# Patient Record
Sex: FEMALE | Race: WHITE | NOT HISPANIC OR LATINO | URBAN - METROPOLITAN AREA
[De-identification: names, ages, dates, MRNs, and addresses within clinical notes are randomized per-mention and may not be internally consistent; named-entity substitution may affect disease eponyms.]

---

## 2019-12-19 ENCOUNTER — OUTPATIENT (OUTPATIENT)
Dept: OUTPATIENT SERVICES | Facility: HOSPITAL | Age: 79
LOS: 1 days | End: 2019-12-19
Payer: COMMERCIAL

## 2019-12-19 DIAGNOSIS — R94.30 ABNORMAL RESULT OF CARDIOVASCULAR FUNCTION STUDY, UNSPECIFIED: ICD-10-CM

## 2019-12-19 PROCEDURE — 93351 STRESS TTE COMPLETE: CPT

## 2019-12-19 PROCEDURE — 93351 STRESS TTE COMPLETE: CPT | Mod: 26,52

## 2021-02-02 PROBLEM — Z00.00 ENCOUNTER FOR PREVENTIVE HEALTH EXAMINATION: Status: ACTIVE | Noted: 2021-02-02

## 2021-02-03 ENCOUNTER — APPOINTMENT (OUTPATIENT)
Dept: PULMONOLOGY | Facility: CLINIC | Age: 81
End: 2021-02-03
Payer: COMMERCIAL

## 2021-02-03 VITALS
SYSTOLIC BLOOD PRESSURE: 120 MMHG | WEIGHT: 82 LBS | DIASTOLIC BLOOD PRESSURE: 60 MMHG | BODY MASS INDEX: 16.53 KG/M2 | OXYGEN SATURATION: 97 % | HEART RATE: 75 BPM | HEIGHT: 59 IN | TEMPERATURE: 97.5 F

## 2021-02-03 PROCEDURE — 99204 OFFICE O/P NEW MOD 45 MIN: CPT | Mod: 25

## 2021-02-03 PROCEDURE — 94060 EVALUATION OF WHEEZING: CPT

## 2021-02-03 PROCEDURE — 94727 GAS DIL/WSHOT DETER LNG VOL: CPT

## 2021-02-03 PROCEDURE — 94729 DIFFUSING CAPACITY: CPT

## 2021-02-03 PROCEDURE — 99072 ADDL SUPL MATRL&STAF TM PHE: CPT

## 2021-02-03 PROCEDURE — 95012 NITRIC OXIDE EXP GAS DETER: CPT

## 2021-02-03 RX ORDER — FLUTICASONE PROPIONATE AND SALMETEROL 50; 100 UG/1; UG/1
100-50 POWDER RESPIRATORY (INHALATION)
Refills: 0 | Status: ACTIVE | COMMUNITY

## 2021-02-03 RX ORDER — DENOSUMAB 60 MG/ML
INJECTION SUBCUTANEOUS
Refills: 0 | Status: ACTIVE | COMMUNITY

## 2021-02-03 RX ORDER — METOPROLOL SUCCINATE 200 MG/1
TABLET, EXTENDED RELEASE ORAL
Refills: 0 | Status: ACTIVE | COMMUNITY

## 2021-02-03 RX ORDER — ATORVASTATIN CALCIUM 80 MG/1
TABLET, FILM COATED ORAL
Refills: 0 | Status: ACTIVE | COMMUNITY

## 2021-02-03 NOTE — CONSULT LETTER
[Dear  ___] : Dear  [unfilled], [( Thank you for referring [unfilled] for consultation for _____ )] : Thank you for referring [unfilled] for consultation for [unfilled] [Please see my note below.] : Please see my note below. [Consult Closing:] : Thank you very much for allowing me to participate in the care of this patient.  If you have any questions, please do not hesitate to contact me. [Sincerely,] : Sincerely, [FreeTextEntry2] : Andre Cornelius MD\par [address]\[place] [address]\par  [FreeTextEntry3] : Mari Brennan MD, FCCP\par

## 2021-02-03 NOTE — HISTORY OF PRESENT ILLNESS
[TextBox_4] : 02/03/2021: Asked to evaluate patient by Dr Cornelius for dyspnea. Reports asthma since age 32. Saw Dr Cornelius for chest heaviness on exertion and a catch in her breath. Breathing is worse, very dyspneic on subway stairs for a couple of months. Takes Advair 100/50 once daily. Takes Proair once in the morning and never needs it throughout the day. She preferred Proventil to Proair. Now insurance wants her on generic albuterol, which she also doesn't like. Last trip to ED for asthma was years ago, never admitted. Last steroids many years ago, before controller inhalers. Never smoker, no inhalations. Is a , not working during pandemic. Gets out and walks a lot.\par

## 2021-02-03 NOTE — ASSESSMENT
[FreeTextEntry1] : Data reviewed:\par \par Cardiac CT ERMI 2020 personally reviewed and shows some mild airway wall thickening\par \par Adriel 02/03/2021 : mild obstruction, FEV1 80% / FENO 10\par PFT 2/3/21: mild obs w no sig response to IBD (FEV1 88-->97%), %, DLCO 60%\par \par Impression:\par Asthma\par \par Plan:\par Needs a step up from Advair 100/50 once daily. Apparently Advair is no longer covered, so we will try Breo 100/25 once daily. Will order Proair Respiclick and see if we can get this for her but alternatively she was given a spacer today and might have better effect from using albuterol MDI w a spacer.\par Return ~1 month for re-evaluation.

## 2021-02-08 ENCOUNTER — APPOINTMENT (OUTPATIENT)
Dept: PULMONOLOGY | Facility: CLINIC | Age: 81
End: 2021-02-08

## 2021-02-16 ENCOUNTER — APPOINTMENT (OUTPATIENT)
Dept: OTOLARYNGOLOGY | Facility: CLINIC | Age: 81
End: 2021-02-16
Payer: COMMERCIAL

## 2021-02-16 VITALS
SYSTOLIC BLOOD PRESSURE: 117 MMHG | BODY MASS INDEX: 17.21 KG/M2 | HEIGHT: 58 IN | TEMPERATURE: 98 F | DIASTOLIC BLOOD PRESSURE: 63 MMHG | WEIGHT: 82 LBS | OXYGEN SATURATION: 90 % | HEART RATE: 73 BPM

## 2021-02-16 DIAGNOSIS — J38.7 OTHER DISEASES OF LARYNX: ICD-10-CM

## 2021-02-16 DIAGNOSIS — R49.0 DYSPHONIA: ICD-10-CM

## 2021-02-16 DIAGNOSIS — K21.9 GASTRO-ESOPHAGEAL REFLUX DISEASE W/OUT ESOPHAGITIS: ICD-10-CM

## 2021-02-16 DIAGNOSIS — H81.10 BENIGN PAROXYSMAL VERTIGO, UNSPECIFIED EAR: ICD-10-CM

## 2021-02-16 PROCEDURE — 99205 OFFICE O/P NEW HI 60 MIN: CPT | Mod: 25

## 2021-02-16 PROCEDURE — 31579 LARYNGOSCOPY TELESCOPIC: CPT

## 2021-02-16 RX ORDER — FAMOTIDINE 40 MG/1
40 TABLET, FILM COATED ORAL
Qty: 30 | Refills: 3 | Status: ACTIVE | COMMUNITY
Start: 2021-02-16 | End: 1900-01-01

## 2021-02-16 NOTE — PROCEDURE
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 After decongestant and anesthesia were applied the laryngoscope was passed. The nasal cavities, nasopharynx, oropharynx, hypopharynx, and larynx were all examined. Vocal folds were examined during respiration and phonation. The following findings were noted:\par  \par Findings: \par Nose: Septum is midline, turbinates are normal, nasal airways patent, mucosa normal\par Nasopharynx: Adenoids normal, no masses, eustachian tube normal\par Oropharynx: Pharyngeal walls symmetric and without lesion. Tonsils/fossae symmetric\par Hypopharynx: Hypopharynx and pyriform sinuses without lesion. No masses or asymmetry. No pooling of secretions.\par Larynx: Epiglottis and aryepiglottic folds were sharp and crisp bilaterally. Bilateral false vocal folds normal appearance. Airway was widely patent. Posterior cricoid edema present.\par  \par Strobe Exam Ratings\par TVF Appearance: bilateral vocal fold atrophy, Right vocal fold with increased vascularity, post 1/3\par TVF Mobility: Normal\par Edema/hypertrophy: + post cricoid edema\par Mucus on TVF: scant\par Glottic Closure: + anterior glottic gapping\par Mucosal Wave: abnormal with breaks\par Amplitude of Vibration: normal\par Phase: symmetric\par Supraglottic Hyperfunction: mild\par Other Findings: none\par  \par Condition: Stable. Patient tolerated procedure well.\par  \par Complications: None\par \par

## 2021-02-16 NOTE — PHYSICAL EXAM
[Midline] : trachea located in midline position [Normal] : no nystagmus [FreeTextEntry1] : Hoarse and raspiness in nature, lack of pitch control, decreased range, normal projection [] : [place]-Hallpike test is negative

## 2021-02-16 NOTE — ASSESSMENT
[FreeTextEntry1] : 80F who presents with dysphonia x 4 years. 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  We discussed voice hygiene was discussed and sheet provided.  She would benefit from Speech therapy evaluation.  Fu in 3 months to reevaluate vocal fold and punctate area of vascularity.  If symptoms persist may benefit from bilateral vocal fold augmentation.\par \par Based on history and physical exam, I do believe there is a mild component of laryngopharyngeal reflux.  At this time I am recommending dietary and behavioral change to reduce acid in the diet.  I am also recommending just famotidine qhs for a 3 months trial, as her symptoms are worst in the AM.\par \par In terms of the dizziness, her [place] hallpike was negative, but the history is consistent with BPPV. Discussed BPPV and sheet provided. \par \par Plan:\par - Rx Famotidine\par - low acid diet modifications- sheet provided\par - voice hygiene- sheet provided\par - speech therapy referral \par - BPPV discussed- sheet provided\par - Cont asthma control with inhaler and pulm follow up\par - f/u in 2-3 months

## 2021-02-16 NOTE — HISTORY OF PRESENT ILLNESS
[de-identified] : 80F who presents per referral from Dr. Brennan for dysphonia. Patient reports about 4 years ago she developed hoarseness that progressively became worse.  She describes it as raspiness that starts in the morning and slightly improves with use throughout the day.  Sometimes she is aphonic.  She can't recall the last time her voice was normal.  She denies any sore throat, globus sensation, dysphagia of solids or liquids. She also reports chronic intermittent dry cough that began a few years ago and has persisted since with associated throat clearing.  She notes the cough has improved since starting a new inhaler for her asthma. 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 She continues to work on [employer] for the [employer].  Her  is a .\par \par In terms of her diet:\par +blueberries, tomatoes, ETOH (glass of wine with dinner)\par - coffee, tea, mint, citrus, \par She also admits to only 1 16oz bottle of water daily\par \par She also reports 3 episodes of "room-spinning' dizziness that lasted a few minutes. She had associated nausea, but no vomiting. No loss of consciousness.  Happens every 2 years and self remits.  Last episode was 2 weeks ago. She denies any hearing changes, tinnitus, otalgia, otorrhea. \par \par No other ENT complaints. No pertinent Fh/Sh.

## 2021-03-10 ENCOUNTER — APPOINTMENT (OUTPATIENT)
Dept: PULMONOLOGY | Facility: CLINIC | Age: 81
End: 2021-03-10
Payer: COMMERCIAL

## 2021-03-10 VITALS
HEART RATE: 65 BPM | SYSTOLIC BLOOD PRESSURE: 100 MMHG | WEIGHT: 82 LBS | HEIGHT: 58 IN | OXYGEN SATURATION: 98 % | BODY MASS INDEX: 17.21 KG/M2 | TEMPERATURE: 97.9 F | DIASTOLIC BLOOD PRESSURE: 70 MMHG

## 2021-03-10 DIAGNOSIS — J45.909 UNSPECIFIED ASTHMA, UNCOMPLICATED: ICD-10-CM

## 2021-03-10 PROCEDURE — 99213 OFFICE O/P EST LOW 20 MIN: CPT | Mod: GC

## 2021-03-10 PROCEDURE — 99072 ADDL SUPL MATRL&STAF TM PHE: CPT

## 2021-03-10 RX ORDER — FLUTICASONE FUROATE AND VILANTEROL TRIFENATATE 100; 25 UG/1; UG/1
100-25 POWDER RESPIRATORY (INHALATION) DAILY
Qty: 3 | Refills: 3 | Status: ACTIVE | COMMUNITY
Start: 2021-02-03 | End: 1900-01-01

## 2021-03-10 NOTE — REVIEW OF SYSTEMS
[Fever] : no fever [Fatigue] : no fatigue [Chills] : no chills [Eye Irritation] : no eye irritation [Nasal Congestion] : no nasal congestion [Sinus Problems] : no sinus problems [Cough] : no cough [Chest Tightness] : no chest tightness [Sputum] : no sputum [Dyspnea] : no dyspnea [Pleuritic Pain] : no pleuritic pain [A.M. Dry Mouth] : no a.m. dry mouth [SOB on Exertion] : no sob on exertion [Chest Discomfort] : no chest discomfort [Orthopnea] : no orthopnea [Palpitations] : no palpitations [Nasal Discharge] : no nasal discharge [Nocturia] : no nocturia [Arthralgias] : no arthralgias [Anemia] : no anemia [Headache] : no headache [Dizziness] : no dizziness [Depression] : no depression [Diabetes] : no diabetes

## 2021-03-10 NOTE — PHYSICAL EXAM
[No Acute Distress] : no acute distress [Normal Oropharynx] : normal oropharynx [I] : Mallampati Class: I [Normal Appearance] : normal appearance [No Neck Mass] : no neck mass [Normal Rate/Rhythm] : normal rate/rhythm [No Resp Distress] : no resp distress [Clear to Auscultation Bilaterally] : clear to auscultation bilaterally [No Abnormalities] : no abnormalities [Benign] : benign [Normal Gait] : normal gait [Normal Color/ Pigmentation] : normal color/ pigmentation [No Focal Deficits] : no focal deficits [Oriented x3] : oriented x3

## 2021-03-10 NOTE — ASSESSMENT
[FreeTextEntry1] : Data reviewed:\par \par Cardiac CT ERMI 2020 personally reviewed and shows some mild airway wall thickening\par \par Adriel 02/03/2021 : mild obstruction, FEV1 80% / FENO 10\par PFT 2/3/21: mild obs w no sig response to IBD (FEV1 88-->97%), %, DLCO 60%\par \par Impression:\par Mild persistent Asthma\par \par Plan:\par Long time asthmatic, ER visits twice in last 5 yrs, Well controlled now since being on Breo. She has been taking Pro Air Respiclick daily, made aware that this is a rescue inhaler, use as needed. Good technique. asked to rinse her mouth after every inhaler to avoid confusion. \par Vaccinated with COVID vaccine. \par Return in 6-12 months or early if needed.\par --\par Attending Addendum\par \par Seen and examined by me and agree w above. Cont Breo, return annually.\par \par

## 2021-03-10 NOTE — HISTORY OF PRESENT ILLNESS
[TextBox_4] : 02/03/2021: Asked to evaluate patient by Dr Cornelius for dyspnea. Reports asthma since age 32. Saw Dr Cornelius for chest heaviness on exertion and a catch in her breath. Breathing is worse, very dyspneic on subway stairs for a couple of months. Takes Advair 100/50 once daily. Takes Proair once in the morning and never needs it throughout the day. She preferred Proventil to Proair. Now insurance wants her on generic albuterol, which she also doesn't like. Last trip to ED for asthma was years ago, never admitted. Last steroids many years ago, before controller inhalers. Never smoker, no inhalations. Is a , not working during pandemic. Gets out and walks a lot.\par \par 03/10/21 [[place]] : follow up visit, feeling of SOB on exertion almost resolved after being of Breo. Denies Cough, wheezing, runny nose, PND. Describes episode of chest heaviness after walking in cold but has happened many times in past , cardiology work up has been normal. She is taking ProAir once a day as well in AM. Likes Proventil over ProAir , would want letter to insurance for approval. Never smoker.

## 2021-03-15 RX ORDER — ALBUTEROL SULFATE 90 UG/1
108 (90 BASE) INHALANT RESPIRATORY (INHALATION) EVERY 4 HOURS
Qty: 3 | Refills: 3 | Status: ACTIVE | COMMUNITY
Start: 2021-02-03 | End: 1900-01-01

## 2021-03-17 ENCOUNTER — APPOINTMENT (OUTPATIENT)
Dept: OTOLARYNGOLOGY | Facility: CLINIC | Age: 81
End: 2021-03-17
Payer: COMMERCIAL

## 2021-03-17 PROCEDURE — 92524 BEHAVRAL QUALIT ANALYS VOICE: CPT | Mod: GN

## 2021-03-17 PROCEDURE — 99072 ADDL SUPL MATRL&STAF TM PHE: CPT

## 2021-03-17 PROCEDURE — 31579 LARYNGOSCOPY TELESCOPIC: CPT

## 2021-03-29 ENCOUNTER — APPOINTMENT (OUTPATIENT)
Dept: OTOLARYNGOLOGY | Facility: CLINIC | Age: 81
End: 2021-03-29
Payer: COMMERCIAL

## 2021-03-29 PROCEDURE — 99072 ADDL SUPL MATRL&STAF TM PHE: CPT

## 2021-03-29 PROCEDURE — 92507 TX SP LANG VOICE COMM INDIV: CPT | Mod: GN

## 2021-04-07 ENCOUNTER — APPOINTMENT (OUTPATIENT)
Dept: OTOLARYNGOLOGY | Facility: CLINIC | Age: 81
End: 2021-04-07

## 2021-04-14 ENCOUNTER — APPOINTMENT (OUTPATIENT)
Dept: OTOLARYNGOLOGY | Facility: CLINIC | Age: 81
End: 2021-04-14
Payer: COMMERCIAL

## 2021-04-14 PROCEDURE — 99072 ADDL SUPL MATRL&STAF TM PHE: CPT

## 2021-04-14 PROCEDURE — 92507 TX SP LANG VOICE COMM INDIV: CPT | Mod: GN

## 2021-04-21 ENCOUNTER — APPOINTMENT (OUTPATIENT)
Dept: OTOLARYNGOLOGY | Facility: CLINIC | Age: 81
End: 2021-04-21
Payer: COMMERCIAL

## 2021-04-21 PROCEDURE — 99072 ADDL SUPL MATRL&STAF TM PHE: CPT

## 2021-04-21 PROCEDURE — 92507 TX SP LANG VOICE COMM INDIV: CPT | Mod: GN

## 2021-05-18 ENCOUNTER — APPOINTMENT (OUTPATIENT)
Dept: OTOLARYNGOLOGY | Facility: CLINIC | Age: 81
End: 2021-05-18

## 2021-11-07 ENCOUNTER — TRANSCRIPTION ENCOUNTER (OUTPATIENT)
Age: 81
End: 2021-11-07

## 2021-12-08 ENCOUNTER — TRANSCRIPTION ENCOUNTER (OUTPATIENT)
Age: 81
End: 2021-12-08

## 2021-12-09 ENCOUNTER — APPOINTMENT (OUTPATIENT)
Dept: PULMONOLOGY | Facility: CLINIC | Age: 81
End: 2021-12-09

## 2022-03-08 ENCOUNTER — TRANSCRIPTION ENCOUNTER (OUTPATIENT)
Age: 82
End: 2022-03-08

## 2022-03-23 ENCOUNTER — RX RENEWAL (OUTPATIENT)
Age: 82
End: 2022-03-23

## 2022-04-11 ENCOUNTER — RX RENEWAL (OUTPATIENT)
Age: 82
End: 2022-04-11